# Patient Record
Sex: FEMALE | Race: WHITE | NOT HISPANIC OR LATINO
[De-identification: names, ages, dates, MRNs, and addresses within clinical notes are randomized per-mention and may not be internally consistent; named-entity substitution may affect disease eponyms.]

---

## 2017-03-20 ENCOUNTER — OTHER (OUTPATIENT)
Age: 42
End: 2017-03-20

## 2017-04-03 ENCOUNTER — OTHER (OUTPATIENT)
Age: 42
End: 2017-04-03

## 2017-04-10 ENCOUNTER — OUTPATIENT (OUTPATIENT)
Dept: OUTPATIENT SERVICES | Facility: HOSPITAL | Age: 42
LOS: 1 days | Discharge: HOME | End: 2017-04-10

## 2017-04-10 DIAGNOSIS — C50.919 MALIGNANT NEOPLASM OF UNSPECIFIED SITE OF UNSPECIFIED FEMALE BREAST: ICD-10-CM

## 2017-04-11 ENCOUNTER — OTHER (OUTPATIENT)
Age: 42
End: 2017-04-11

## 2017-04-11 ENCOUNTER — APPOINTMENT (OUTPATIENT)
Dept: HEMATOLOGY ONCOLOGY | Facility: CLINIC | Age: 42
End: 2017-04-11

## 2017-04-11 VITALS
HEART RATE: 69 BPM | TEMPERATURE: 98.2 F | OXYGEN SATURATION: 100 % | RESPIRATION RATE: 16 BRPM | DIASTOLIC BLOOD PRESSURE: 91 MMHG | SYSTOLIC BLOOD PRESSURE: 135 MMHG | WEIGHT: 153 LBS | BODY MASS INDEX: 26.12 KG/M2

## 2017-08-23 ENCOUNTER — OUTPATIENT (OUTPATIENT)
Dept: OUTPATIENT SERVICES | Facility: HOSPITAL | Age: 42
LOS: 1 days | Discharge: HOME | End: 2017-08-23

## 2017-08-23 DIAGNOSIS — C50.919 MALIGNANT NEOPLASM OF UNSPECIFIED SITE OF UNSPECIFIED FEMALE BREAST: ICD-10-CM

## 2017-08-29 ENCOUNTER — APPOINTMENT (OUTPATIENT)
Dept: HEMATOLOGY ONCOLOGY | Facility: CLINIC | Age: 42
End: 2017-08-29
Payer: COMMERCIAL

## 2017-08-29 VITALS
OXYGEN SATURATION: 100 % | BODY MASS INDEX: 25.22 KG/M2 | HEART RATE: 68 BPM | WEIGHT: 147.71 LBS | DIASTOLIC BLOOD PRESSURE: 70 MMHG | TEMPERATURE: 98.2 F | RESPIRATION RATE: 16 BRPM | SYSTOLIC BLOOD PRESSURE: 110 MMHG

## 2017-08-29 PROCEDURE — 99214 OFFICE O/P EST MOD 30 MIN: CPT

## 2017-12-18 ENCOUNTER — OUTPATIENT (OUTPATIENT)
Dept: OUTPATIENT SERVICES | Facility: HOSPITAL | Age: 42
LOS: 1 days | Discharge: HOME | End: 2017-12-18

## 2017-12-18 DIAGNOSIS — C50.919 MALIGNANT NEOPLASM OF UNSPECIFIED SITE OF UNSPECIFIED FEMALE BREAST: ICD-10-CM

## 2017-12-26 ENCOUNTER — APPOINTMENT (OUTPATIENT)
Dept: HEMATOLOGY ONCOLOGY | Facility: CLINIC | Age: 42
End: 2017-12-26
Payer: COMMERCIAL

## 2017-12-26 VITALS
SYSTOLIC BLOOD PRESSURE: 123 MMHG | RESPIRATION RATE: 16 BRPM | DIASTOLIC BLOOD PRESSURE: 94 MMHG | BODY MASS INDEX: 25.75 KG/M2 | TEMPERATURE: 97.4 F | OXYGEN SATURATION: 100 % | WEIGHT: 150.8 LBS | HEART RATE: 69 BPM

## 2017-12-26 PROCEDURE — 99214 OFFICE O/P EST MOD 30 MIN: CPT

## 2017-12-27 ENCOUNTER — APPOINTMENT (OUTPATIENT)
Dept: HEMATOLOGY ONCOLOGY | Facility: CLINIC | Age: 42
End: 2017-12-27
Payer: COMMERCIAL

## 2017-12-27 VITALS
SYSTOLIC BLOOD PRESSURE: 116 MMHG | TEMPERATURE: 98.3 F | WEIGHT: 150.8 LBS | DIASTOLIC BLOOD PRESSURE: 76 MMHG | HEART RATE: 74 BPM | OXYGEN SATURATION: 100 % | RESPIRATION RATE: 16 BRPM | BODY MASS INDEX: 25.75 KG/M2

## 2017-12-27 PROCEDURE — 99212 OFFICE O/P EST SF 10 MIN: CPT

## 2018-02-28 ENCOUNTER — RESULT REVIEW (OUTPATIENT)
Age: 43
End: 2018-02-28

## 2018-03-01 ENCOUNTER — RX RENEWAL (OUTPATIENT)
Age: 43
End: 2018-03-01

## 2018-04-09 ENCOUNTER — RX RENEWAL (OUTPATIENT)
Age: 43
End: 2018-04-09

## 2018-06-26 ENCOUNTER — OUTPATIENT (OUTPATIENT)
Dept: OUTPATIENT SERVICES | Facility: HOSPITAL | Age: 43
LOS: 1 days | Discharge: HOME | End: 2018-06-26

## 2018-06-26 DIAGNOSIS — C50.919 MALIGNANT NEOPLASM OF UNSPECIFIED SITE OF UNSPECIFIED FEMALE BREAST: ICD-10-CM

## 2018-06-29 ENCOUNTER — APPOINTMENT (OUTPATIENT)
Dept: HEMATOLOGY ONCOLOGY | Facility: CLINIC | Age: 43
End: 2018-06-29
Payer: COMMERCIAL

## 2018-06-29 VITALS
SYSTOLIC BLOOD PRESSURE: 114 MMHG | HEART RATE: 78 BPM | TEMPERATURE: 98.2 F | BODY MASS INDEX: 26.31 KG/M2 | OXYGEN SATURATION: 99 % | WEIGHT: 154.1 LBS | DIASTOLIC BLOOD PRESSURE: 79 MMHG | RESPIRATION RATE: 16 BRPM

## 2018-06-29 PROCEDURE — 99213 OFFICE O/P EST LOW 20 MIN: CPT

## 2018-12-17 ENCOUNTER — OUTPATIENT (OUTPATIENT)
Dept: OUTPATIENT SERVICES | Facility: HOSPITAL | Age: 43
LOS: 1 days | Discharge: HOME | End: 2018-12-17

## 2018-12-17 DIAGNOSIS — C50.919 MALIGNANT NEOPLASM OF UNSPECIFIED SITE OF UNSPECIFIED FEMALE BREAST: ICD-10-CM

## 2018-12-28 ENCOUNTER — APPOINTMENT (OUTPATIENT)
Dept: HEMATOLOGY ONCOLOGY | Facility: CLINIC | Age: 43
End: 2018-12-28
Payer: COMMERCIAL

## 2018-12-28 VITALS
SYSTOLIC BLOOD PRESSURE: 123 MMHG | BODY MASS INDEX: 26.69 KG/M2 | TEMPERATURE: 98.5 F | OXYGEN SATURATION: 100 % | HEART RATE: 77 BPM | DIASTOLIC BLOOD PRESSURE: 83 MMHG | WEIGHT: 156.31 LBS | RESPIRATION RATE: 17 BRPM

## 2018-12-28 PROCEDURE — 99214 OFFICE O/P EST MOD 30 MIN: CPT

## 2018-12-28 RX ORDER — BIOTIN 10 MG
TABLET ORAL
Refills: 0 | Status: DISCONTINUED | COMMUNITY
End: 2018-12-28

## 2018-12-28 NOTE — HISTORY OF PRESENT ILLNESS
[Disease: _____________________] : Disease: [unfilled] [T: ___] : T[unfilled] [N: ___] : N[unfilled] [M: ___] : M[unfilled] [AJCC Stage: ____] : AJCC Stage: [unfilled] [de-identified] : H/O  right breast ca s/p B/L MRm with expanders 115. Path right breast showing foci of invasion measuring 3.5 mmm (2 such foci on one slide) with several other foci measuring a few microns to over 1 mm and innumerable foci of microinvasion (over 50), no evidence of LVI with 0/2 SLNs and 0/1 NSLN, ER (-) [place] (-) and her 2 yandel (+); the left breast was w/o cancer and 0/4 SLNs. S/P weekly taxol + H 12/12 in 5/15 and completion of H in 2/16. BRCA 12 neg [de-identified] : ER (-) [place] (-) Her 2neu (+) [de-identified] : Pt w/o complaints. Notes a good appetite stable wt and excellent performance status. Works out [employer] +, Works FT\par \par Menses remain regular \par \par FH \par Mat aunt colon ca at 58\par Mat great aunt br ca at older age\par Pat uncle colon ca at 50\par \par Pt is scheduling a colonoscopy. Has not decided to do further genetic counseling and testing yet. \par Mom and Dad had 4 sibs each of note\par \par Was contacted by plastic surgeon re her having texture implants placed

## 2018-12-28 NOTE — PHYSICAL EXAM
[Fully active, able to carry on all pre-disease performance without restriction] : Status 0 - Fully active, able to carry on all pre-disease performance without restriction [de-identified] : s/p B/L MRM with implants, no masses;  B/L ax neg

## 2019-04-03 ENCOUNTER — RESULT REVIEW (OUTPATIENT)
Age: 44
End: 2019-04-03

## 2019-05-29 ENCOUNTER — OUTPATIENT (OUTPATIENT)
Dept: OUTPATIENT SERVICES | Facility: HOSPITAL | Age: 44
LOS: 1 days | Discharge: HOME | End: 2019-05-29

## 2019-05-29 DIAGNOSIS — C50.919 MALIGNANT NEOPLASM OF UNSPECIFIED SITE OF UNSPECIFIED FEMALE BREAST: ICD-10-CM

## 2019-06-04 ENCOUNTER — APPOINTMENT (OUTPATIENT)
Dept: HEMATOLOGY ONCOLOGY | Facility: CLINIC | Age: 44
End: 2019-06-04
Payer: COMMERCIAL

## 2019-06-04 VITALS
BODY MASS INDEX: 26.54 KG/M2 | WEIGHT: 155.42 LBS | HEART RATE: 59 BPM | RESPIRATION RATE: 17 BRPM | DIASTOLIC BLOOD PRESSURE: 87 MMHG | TEMPERATURE: 97.7 F | OXYGEN SATURATION: 99 % | SYSTOLIC BLOOD PRESSURE: 127 MMHG

## 2019-06-04 PROCEDURE — 99214 OFFICE O/P EST MOD 30 MIN: CPT

## 2019-06-05 NOTE — HISTORY OF PRESENT ILLNESS
[Disease: _____________________] : Disease: [unfilled] [N: ___] : N[unfilled] [T: ___] : T[unfilled] [M: ___] : M[unfilled] [AJCC Stage: ____] : AJCC Stage: [unfilled] [de-identified] : H/O  right breast ca s/p B/L MRm with expanders 115. Path right breast showing foci of invasion measuring 3.5 mmm (2 such foci on one slide) with several other foci measuring a few microns to over 1 mm and innumerable foci of microinvasion (over 50), no evidence of LVI with 0/2 SLNs and 0/1 NSLN, ER (-) [place] (-) and her 2 yandel (+); the left breast was w/o cancer and 0/4 SLNs. S/P weekly taxol + H 12/12 in 5/15 and completion of H in 2/16. BRCA 12 neg [de-identified] : ER (-) [place] (-) Her 2neu (+) [de-identified] : Pt w/o complaints. Notes a good appetite stable wt and excellent performance status. Works out [employer] + but a little frustrated that she does not loose weight.  Works FT. Looking forward to end of school year for time off with her kids. \par \par Menses remain regular \par \par FH \par Mat aunt colon ca at 58\par Mat great aunt br ca at older age\par Pat uncle colon ca at 50\par \par In the interim she had what she describes as a genetic panel sent off by her gyn, the results of which are still pending. \par \par Was contacted by plastic surgeon re her having texture implants replaced. is considering whether to do so now or in the future.

## 2019-06-05 NOTE — PHYSICAL EXAM
[Fully active, able to carry on all pre-disease performance without restriction] : Status 0 - Fully active, able to carry on all pre-disease performance without restriction [de-identified] : s/p B/L MRM with implants, no masses;  B/L ax neg

## 2019-12-17 ENCOUNTER — OUTPATIENT (OUTPATIENT)
Dept: OUTPATIENT SERVICES | Facility: HOSPITAL | Age: 44
LOS: 1 days | Discharge: HOME | End: 2019-12-17

## 2019-12-17 DIAGNOSIS — C50.919 MALIGNANT NEOPLASM OF UNSPECIFIED SITE OF UNSPECIFIED FEMALE BREAST: ICD-10-CM

## 2019-12-24 ENCOUNTER — APPOINTMENT (OUTPATIENT)
Dept: HEMATOLOGY ONCOLOGY | Facility: CLINIC | Age: 44
End: 2019-12-24
Payer: COMMERCIAL

## 2019-12-24 VITALS
TEMPERATURE: 98 F | OXYGEN SATURATION: 100 % | DIASTOLIC BLOOD PRESSURE: 85 MMHG | SYSTOLIC BLOOD PRESSURE: 126 MMHG | HEART RATE: 77 BPM | BODY MASS INDEX: 26.5 KG/M2 | WEIGHT: 155.2 LBS | RESPIRATION RATE: 17 BRPM

## 2019-12-24 PROCEDURE — 99213 OFFICE O/P EST LOW 20 MIN: CPT

## 2019-12-26 NOTE — PHYSICAL EXAM
[Fully active, able to carry on all pre-disease performance without restriction] : Status 0 - Fully active, able to carry on all pre-disease performance without restriction [de-identified] : s/p B/L MRM with implants, no masses;  B/L ax neg

## 2019-12-26 NOTE — HISTORY OF PRESENT ILLNESS
[Disease: _____________________] : Disease: [unfilled] [T: ___] : T[unfilled] [N: ___] : N[unfilled] [M: ___] : M[unfilled] [AJCC Stage: ____] : AJCC Stage: [unfilled] [de-identified] : H/O  right breast ca s/p B/L MRm with expanders 115. Path right breast showing foci of invasion measuring 3.5 mmm (2 such foci on one slide) with several other foci measuring a few microns to over 1 mm and innumerable foci of microinvasion (over 50), no evidence of LVI with 0/2 SLNs and 0/1 NSLN, ER (-) [place] (-) and her 2 yandel (+); the left breast was w/o cancer and 0/4 SLNs. S/P weekly taxol + H 12/12 in 5/15 and completion of H in 2/16. BRCA 12 neg [de-identified] : ER (-) [place] (-) Her 2neu (+) [de-identified] : Pt w/o complaints. Notes a good appetite stable wt and excellent performance status. Cont to remain active working out  approx 5-6 days / week.  \par \par Menses remain regular \par \par FH \par Mat aunt colon ca at 58\par Mat great aunt br ca at older age\par Pat uncle colon ca at 50\par \par In the interim she had what she describes as a genetic panel sent off by her gyn, the results of which are still pending. \par \par Was contacted by plastic surgeon re her having texture implants replaced. is considering whether to do so now or in the future.

## 2020-05-29 ENCOUNTER — RESULT REVIEW (OUTPATIENT)
Age: 45
End: 2020-05-29

## 2020-06-01 ENCOUNTER — APPOINTMENT (OUTPATIENT)
Dept: MRI IMAGING | Facility: IMAGING CENTER | Age: 45
End: 2020-06-01
Payer: COMMERCIAL

## 2020-06-01 ENCOUNTER — OUTPATIENT (OUTPATIENT)
Dept: OUTPATIENT SERVICES | Facility: HOSPITAL | Age: 45
LOS: 1 days | End: 2020-06-01
Payer: COMMERCIAL

## 2020-06-01 DIAGNOSIS — Z00.8 ENCOUNTER FOR OTHER GENERAL EXAMINATION: ICD-10-CM

## 2020-06-01 PROCEDURE — 77047 MRI BREAST C- BILATERAL: CPT

## 2020-06-01 PROCEDURE — 77047 MRI BREAST C- BILATERAL: CPT | Mod: 26

## 2020-07-22 ENCOUNTER — APPOINTMENT (OUTPATIENT)
Dept: HEMATOLOGY ONCOLOGY | Facility: CLINIC | Age: 45
End: 2020-07-22

## 2020-10-29 ENCOUNTER — OUTPATIENT (OUTPATIENT)
Dept: OUTPATIENT SERVICES | Facility: HOSPITAL | Age: 45
LOS: 1 days | Discharge: HOME | End: 2020-10-29

## 2020-10-29 DIAGNOSIS — C50.919 MALIGNANT NEOPLASM OF UNSPECIFIED SITE OF UNSPECIFIED FEMALE BREAST: ICD-10-CM

## 2020-11-03 ENCOUNTER — APPOINTMENT (OUTPATIENT)
Dept: HEMATOLOGY ONCOLOGY | Facility: CLINIC | Age: 45
End: 2020-11-03
Payer: COMMERCIAL

## 2020-11-03 VITALS
DIASTOLIC BLOOD PRESSURE: 89 MMHG | BODY MASS INDEX: 26.73 KG/M2 | HEART RATE: 70 BPM | OXYGEN SATURATION: 99 % | TEMPERATURE: 98 F | RESPIRATION RATE: 16 BRPM | SYSTOLIC BLOOD PRESSURE: 139 MMHG | WEIGHT: 156.53 LBS

## 2020-11-03 PROCEDURE — 99072 ADDL SUPL MATRL&STAF TM PHE: CPT

## 2020-11-03 PROCEDURE — 99213 OFFICE O/P EST LOW 20 MIN: CPT

## 2020-11-04 NOTE — HISTORY OF PRESENT ILLNESS
[Disease: _____________________] : Disease: [unfilled] [T: ___] : T[unfilled] [N: ___] : N[unfilled] [M: ___] : M[unfilled] [AJCC Stage: ____] : AJCC Stage: [unfilled] [de-identified] : H/O  right breast ca s/p B/L MRm with expanders 115. Path right breast showing foci of invasion measuring 3.5 mmm (2 such foci on one slide) with several other foci measuring a few microns to over 1 mm and innumerable foci of microinvasion (over 50), no evidence of LVI with 0/2 SLNs and 0/1 NSLN, ER (-) [place] (-) and her 2 yandel (+); the left breast was w/o cancer and 0/4 SLNs. S/P weekly taxol + H 12/12 in 5/15 and completion of H in 2/16. BRCA 12 neg [de-identified] : ER (-) [place] (-) Her 2neu (+) [de-identified] : Pt w/o complaints. Notes a good appetite stable wt and excellent performance status. 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 \par \par Denies any new symptoms.  \par \par Menses remain regular \par \par FH \par Mat aunt colon ca at 58\par Mat great aunt br ca at older age\par Pat uncle colon ca at 50\par \par In the interim she had what she describes as a genetic panel sent off by her gyn, the results of which are still pending. \par \par Was contacted by plastic surgeon re her having texture implants replaced. is considering whether to do so now or in the future.

## 2020-11-04 NOTE — PHYSICAL EXAM
[Fully active, able to carry on all pre-disease performance without restriction] : Status 0 - Fully active, able to carry on all pre-disease performance without restriction [Normal] : affect appropriate [de-identified] : s/p B/L MRM with implants, no masses;  B/L ax neg

## 2020-11-24 ENCOUNTER — RESULT REVIEW (OUTPATIENT)
Age: 45
End: 2020-11-24

## 2021-02-03 LAB — CYTOLOGY CVX/VAG DOC THIN PREP: NORMAL

## 2021-02-04 ENCOUNTER — ASOB RESULT (OUTPATIENT)
Age: 46
End: 2021-02-04

## 2021-02-04 ENCOUNTER — APPOINTMENT (OUTPATIENT)
Dept: OBGYN | Facility: CLINIC | Age: 46
End: 2021-02-04
Payer: COMMERCIAL

## 2021-02-04 VITALS
HEIGHT: 66 IN | BODY MASS INDEX: 23.78 KG/M2 | WEIGHT: 148 LBS | SYSTOLIC BLOOD PRESSURE: 120 MMHG | DIASTOLIC BLOOD PRESSURE: 84 MMHG

## 2021-02-04 DIAGNOSIS — Z85.3 PERSONAL HISTORY OF MALIGNANT NEOPLASM OF BREAST: ICD-10-CM

## 2021-02-04 DIAGNOSIS — Z30.9 ENCOUNTER FOR CONTRACEPTIVE MANAGEMENT, UNSPECIFIED: ICD-10-CM

## 2021-02-04 DIAGNOSIS — N92.6 IRREGULAR MENSTRUATION, UNSPECIFIED: ICD-10-CM

## 2021-02-04 DIAGNOSIS — E04.1 NONTOXIC SINGLE THYROID NODULE: ICD-10-CM

## 2021-02-04 DIAGNOSIS — N89.8 OTHER SPECIFIED NONINFLAMMATORY DISORDERS OF VAGINA: ICD-10-CM

## 2021-02-04 LAB
BILIRUB UR QL STRIP: NORMAL
CLARITY UR: CLEAR
COLLECTION METHOD: NORMAL
GLUCOSE UR-MCNC: NORMAL
HCG UR QL: NORMAL EU/DL
HGB UR QL STRIP.AUTO: NORMAL
KETONES UR-MCNC: NORMAL
LEUKOCYTE ESTERASE UR QL STRIP: NORMAL
NITRITE UR QL STRIP: NORMAL
PH UR STRIP: NORMAL
PROT UR STRIP-MCNC: NORMAL
SP GR UR STRIP: NORMAL

## 2021-02-04 PROCEDURE — 99396 PREV VISIT EST AGE 40-64: CPT | Mod: 25

## 2021-02-04 PROCEDURE — 99072 ADDL SUPL MATRL&STAF TM PHE: CPT

## 2021-02-04 PROCEDURE — 76830 TRANSVAGINAL US NON-OB: CPT

## 2021-02-04 PROCEDURE — 81003 URINALYSIS AUTO W/O SCOPE: CPT | Mod: QW

## 2021-02-04 PROCEDURE — 82274 ASSAY TEST FOR BLOOD FECAL: CPT | Mod: QW

## 2021-02-04 RX ORDER — LACTIC ACID, L-, CITRIC ACID MONOHYDRATE, AND POTASSIUM BITARTRATE 90; 50; 20 MG/5G; MG/5G; MG/5G
1.8-1-0.4 GEL VAGINAL
Qty: 30 | Refills: 3 | Status: ACTIVE | COMMUNITY
Start: 2021-02-04 | End: 1900-01-01

## 2021-02-04 NOTE — PHYSICAL EXAM
[Appropriately responsive] : appropriately responsive [Alert] : alert [No Acute Distress] : no acute distress [No Lymphadenopathy] : no lymphadenopathy [Thyroid Nodule] : thyroid nodule [Regular Rate Rhythm] : regular rate rhythm [No Murmurs] : no murmurs [Soft] : soft [Clear to Auscultation B/L] : clear to auscultation bilaterally [Non-tender] : non-tender [Non-distended] : non-distended [No HSM] : No HSM [No Lesions] : no lesions [No Mass] : no mass [Oriented x3] : oriented x3 [Labia Majora] : normal [Normal] : normal [FreeTextEntry3] : right thyroid nodules [FreeTextEntry9] : FIT neg

## 2021-02-04 NOTE — HISTORY OF PRESENT ILLNESS
[Patient reported PAP Smear was normal] : Patient reported PAP Smear was normal [Patient reported colonoscopy was normal] : Patient reported colonoscopy was normal [FreeTextEntry1] : \par ovulatory pain using motrin. Pt reports regular periods.  No BTB or abnormal vaginal discharge.  Pt denies any urinary or bowel issues or blood in stools.  + ovulatory pain and uses motrin\par \par Using condoms and did not like IUD and refuses any hormonal options due to hx of Br cancer s/p b/l mastectomy ER,  [place] neg\par Br mri  in 6/20 neg, last  BR MRI 6/20 neg.  TVS 5/20-neg [TextBox_19] : mastectomy [PapSmeardate] : 5/20 [ColonoscopyDate] : 2019

## 2021-02-04 NOTE — PLAN
[FreeTextEntry1] : 45 yr old  with hx BR cancer s/p mastectomy ER, VT-eg nl gyn exam.\par \par 1. f/u endocrine for thyroid nodules\par 2. Phexxy d/w pt for contraception\par 3. TVS today\par 4. fu with b surgeon about repeat Br MRI and oncologist Dr. Watson

## 2021-02-05 LAB — HPV HIGH+LOW RISK DNA PNL CVX: NOT DETECTED

## 2021-02-08 LAB — CYTOLOGY CVX/VAG DOC THIN PREP: NORMAL

## 2021-09-01 ENCOUNTER — OUTPATIENT (OUTPATIENT)
Dept: OUTPATIENT SERVICES | Facility: HOSPITAL | Age: 46
LOS: 1 days | Discharge: HOME | End: 2021-09-01

## 2021-09-01 DIAGNOSIS — C50.919 MALIGNANT NEOPLASM OF UNSPECIFIED SITE OF UNSPECIFIED FEMALE BREAST: ICD-10-CM

## 2021-09-03 ENCOUNTER — APPOINTMENT (OUTPATIENT)
Dept: HEMATOLOGY ONCOLOGY | Facility: CLINIC | Age: 46
End: 2021-09-03
Payer: COMMERCIAL

## 2021-09-03 VITALS
TEMPERATURE: 97.5 F | WEIGHT: 156.97 LBS | HEART RATE: 89 BPM | DIASTOLIC BLOOD PRESSURE: 89 MMHG | BODY MASS INDEX: 25.23 KG/M2 | RESPIRATION RATE: 16 BRPM | HEIGHT: 66 IN | SYSTOLIC BLOOD PRESSURE: 131 MMHG | OXYGEN SATURATION: 100 %

## 2021-09-03 PROCEDURE — 99213 OFFICE O/P EST LOW 20 MIN: CPT

## 2021-09-03 NOTE — HISTORY OF PRESENT ILLNESS
[Disease: _____________________] : Disease: [unfilled] [T: ___] : T[unfilled] [N: ___] : N[unfilled] [M: ___] : M[unfilled] [AJCC Stage: ____] : AJCC Stage: [unfilled] [de-identified] : H/O  right breast ca s/p B/L MRm with expanders 115. Path right breast showing foci of invasion measuring 3.5 mmm (2 such foci on one slide) with several other foci measuring a few microns to over 1 mm and innumerable foci of microinvasion (over 50), no evidence of LVI with 0/2 SLNs and 0/1 NSLN, ER (-) [place] (-) and her 2 yandel (+); the left breast was w/o cancer and 0/4 SLNs. S/P weekly taxol + H 12/12 in 5/15 and completion of H in 2/16. BRCA 12 neg [de-identified] : ER (-) [place] (-) Her 2neu (+) [de-identified] : Pt w/o complaints. Notes a good appetite stable wt and excellent performance status. 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 \par \par Anxious and upset at [employer] demanding Covid 19 vaccines - she has not had one and does not wish to do so. \par \par Denies any new symptoms.  \par \par Menses remain regular \par \par FH \par Mat aunt colon ca at 58\par Mat great aunt br ca at older age\par Pat uncle colon ca at 50\par \par In the interim she had what she describes as a genetic panel sent off by her gyn, the results of which are still pending. \par \par Was contacted by plastic surgeon re her having texture implants replaced. is considering whether to do so now or in the future.

## 2021-09-03 NOTE — PHYSICAL EXAM
[Fully active, able to carry on all pre-disease performance without restriction] : Status 0 - Fully active, able to carry on all pre-disease performance without restriction [Normal] : grossly intact [de-identified] : s/p B/L MRM with implants, no masses;  B/L ax neg

## 2021-12-04 ENCOUNTER — ASOB RESULT (OUTPATIENT)
Age: 46
End: 2021-12-04

## 2021-12-04 ENCOUNTER — APPOINTMENT (OUTPATIENT)
Dept: OBGYN | Facility: CLINIC | Age: 46
End: 2021-12-04
Payer: COMMERCIAL

## 2021-12-04 VITALS
DIASTOLIC BLOOD PRESSURE: 82 MMHG | SYSTOLIC BLOOD PRESSURE: 126 MMHG | WEIGHT: 145 LBS | HEIGHT: 66 IN | BODY MASS INDEX: 23.3 KG/M2

## 2021-12-04 PROCEDURE — 76830 TRANSVAGINAL US NON-OB: CPT

## 2021-12-04 PROCEDURE — 99214 OFFICE O/P EST MOD 30 MIN: CPT

## 2022-12-22 ENCOUNTER — APPOINTMENT (OUTPATIENT)
Dept: OBGYN | Facility: CLINIC | Age: 47
End: 2022-12-22

## 2022-12-22 ENCOUNTER — ASOB RESULT (OUTPATIENT)
Age: 47
End: 2022-12-22

## 2022-12-22 PROCEDURE — 76830 TRANSVAGINAL US NON-OB: CPT

## 2022-12-27 ENCOUNTER — NON-APPOINTMENT (OUTPATIENT)
Age: 47
End: 2022-12-27

## 2022-12-27 ENCOUNTER — APPOINTMENT (OUTPATIENT)
Dept: OBGYN | Facility: CLINIC | Age: 47
End: 2022-12-27

## 2023-01-24 ENCOUNTER — APPOINTMENT (OUTPATIENT)
Dept: OBGYN | Facility: CLINIC | Age: 48
End: 2023-01-24
Payer: COMMERCIAL

## 2023-01-24 VITALS
WEIGHT: 150 LBS | HEIGHT: 66 IN | SYSTOLIC BLOOD PRESSURE: 138 MMHG | DIASTOLIC BLOOD PRESSURE: 81 MMHG | BODY MASS INDEX: 24.11 KG/M2

## 2023-01-24 PROCEDURE — 82270 OCCULT BLOOD FECES: CPT

## 2023-01-24 PROCEDURE — 99396 PREV VISIT EST AGE 40-64: CPT

## 2023-01-25 LAB — HPV HIGH+LOW RISK DNA PNL CVX: NOT DETECTED

## 2023-01-27 ENCOUNTER — OUTPATIENT (OUTPATIENT)
Dept: OUTPATIENT SERVICES | Facility: HOSPITAL | Age: 48
LOS: 1 days | Discharge: HOME | End: 2023-01-27

## 2023-01-27 DIAGNOSIS — C50.919 MALIGNANT NEOPLASM OF UNSPECIFIED SITE OF UNSPECIFIED FEMALE BREAST: ICD-10-CM

## 2023-01-27 LAB — CYTOLOGY CVX/VAG DOC THIN PREP: NORMAL

## 2023-02-03 ENCOUNTER — APPOINTMENT (OUTPATIENT)
Dept: HEMATOLOGY ONCOLOGY | Facility: CLINIC | Age: 48
End: 2023-02-03
Payer: COMMERCIAL

## 2023-02-03 VITALS
TEMPERATURE: 97.3 F | WEIGHT: 156.09 LBS | SYSTOLIC BLOOD PRESSURE: 131 MMHG | RESPIRATION RATE: 16 BRPM | HEART RATE: 92 BPM | OXYGEN SATURATION: 93 % | BODY MASS INDEX: 25.19 KG/M2 | DIASTOLIC BLOOD PRESSURE: 86 MMHG

## 2023-02-03 DIAGNOSIS — C50.919 MALIGNANT NEOPLASM OF UNSPECIFIED SITE OF UNSPECIFIED FEMALE BREAST: ICD-10-CM

## 2023-02-03 DIAGNOSIS — F41.9 ANXIETY DISORDER, UNSPECIFIED: ICD-10-CM

## 2023-02-03 PROCEDURE — 99213 OFFICE O/P EST LOW 20 MIN: CPT

## 2023-02-03 NOTE — PHYSICAL EXAM
[Fully active, able to carry on all pre-disease performance without restriction] : Status 0 - Fully active, able to carry on all pre-disease performance without restriction [Normal] : affect appropriate [de-identified] : s/p B/L MRM with implants, no masses;  B/L ax neg

## 2023-02-03 NOTE — HISTORY OF PRESENT ILLNESS
[Disease: _____________________] : Disease: [unfilled] [T: ___] : T[unfilled] [N: ___] : N[unfilled] [M: ___] : M[unfilled] [AJCC Stage: ____] : AJCC Stage: [unfilled] [de-identified] : H/O  right breast ca s/p B/L MRm with expanders 115. Path right breast showing foci of invasion measuring 3.5 mmm (2 such foci on one slide) with several other foci measuring a few microns to over 1 mm and innumerable foci of microinvasion (over 50), no evidence of LVI with 0/2 SLNs and 0/1 NSLN, ER (-) [place] (-) and her 2 yandel (+); the left breast was w/o cancer and 0/4 SLNs. S/P weekly taxol + H 12/12 in 5/15 and completion of H in 2/16. BRCA 12 neg [de-identified] : ER (-) [place] (-) Her 2neu (+) [de-identified] : Pt w/o complaints. \par \par Notes a good appetite stable wt and excellent performance status. 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 \par \par Menses remain regular \par \par Cancer predisposition panel test done by gyn 3-4 yr ago _"neg " per pt\par \par FH \par Mat aunt colon ca at 58\par Mat great aunt br ca at older age\par Pat uncle colon ca at 50\par \par In the interim she had what she describes as a genetic panel sent off by her gyn, the results of which are still pending. \par \par Was contacted by plastic surgeon re her having texture implants replaced. is considering whether to do so now or in the future.

## 2023-02-13 RX ORDER — ALPRAZOLAM 0.25 MG/1
0.25 TABLET ORAL
Qty: 30 | Refills: 0 | Status: ACTIVE | COMMUNITY
Start: 2021-09-03 | End: 1900-01-01

## 2023-06-28 ENCOUNTER — APPOINTMENT (OUTPATIENT)
Dept: OBGYN | Facility: CLINIC | Age: 48
End: 2023-06-28

## 2023-09-06 ENCOUNTER — APPOINTMENT (OUTPATIENT)
Dept: OBGYN | Facility: CLINIC | Age: 48
End: 2023-09-06

## 2023-10-03 ENCOUNTER — APPOINTMENT (OUTPATIENT)
Dept: OBGYN | Facility: CLINIC | Age: 48
End: 2023-10-03

## 2023-10-12 ENCOUNTER — NON-APPOINTMENT (OUTPATIENT)
Age: 48
End: 2023-10-12

## 2023-10-17 ENCOUNTER — APPOINTMENT (OUTPATIENT)
Dept: OBGYN | Facility: CLINIC | Age: 48
End: 2023-10-17
Payer: COMMERCIAL

## 2023-10-17 ENCOUNTER — ASOB RESULT (OUTPATIENT)
Age: 48
End: 2023-10-17

## 2023-10-17 VITALS
SYSTOLIC BLOOD PRESSURE: 143 MMHG | WEIGHT: 156 LBS | HEIGHT: 66 IN | BODY MASS INDEX: 25.07 KG/M2 | DIASTOLIC BLOOD PRESSURE: 86 MMHG

## 2023-10-17 DIAGNOSIS — Z01.419 ENCOUNTER FOR GYNECOLOGICAL EXAMINATION (GENERAL) (ROUTINE) W/OUT ABNORMAL FINDINGS: ICD-10-CM

## 2023-10-17 DIAGNOSIS — Z85.3 PERSONAL HISTORY OF MALIGNANT NEOPLASM OF BREAST: ICD-10-CM

## 2023-10-17 PROCEDURE — 82270 OCCULT BLOOD FECES: CPT

## 2023-10-17 PROCEDURE — 76830 TRANSVAGINAL US NON-OB: CPT

## 2023-10-17 PROCEDURE — 99213 OFFICE O/P EST LOW 20 MIN: CPT

## 2024-12-02 ENCOUNTER — APPOINTMENT (OUTPATIENT)
Dept: OBGYN | Facility: CLINIC | Age: 49
End: 2024-12-02

## 2024-12-30 ENCOUNTER — NON-APPOINTMENT (OUTPATIENT)
Age: 49
End: 2024-12-30

## 2024-12-30 ENCOUNTER — ASOB RESULT (OUTPATIENT)
Age: 49
End: 2024-12-30

## 2024-12-30 ENCOUNTER — APPOINTMENT (OUTPATIENT)
Dept: OBGYN | Facility: CLINIC | Age: 49
End: 2024-12-30
Payer: COMMERCIAL

## 2024-12-30 VITALS
BODY MASS INDEX: 24.91 KG/M2 | HEIGHT: 66 IN | DIASTOLIC BLOOD PRESSURE: 87 MMHG | SYSTOLIC BLOOD PRESSURE: 129 MMHG | WEIGHT: 155 LBS

## 2024-12-30 DIAGNOSIS — Z01.419 ENCOUNTER FOR GYNECOLOGICAL EXAMINATION (GENERAL) (ROUTINE) W/OUT ABNORMAL FINDINGS: ICD-10-CM

## 2024-12-30 PROCEDURE — 99396 PREV VISIT EST AGE 40-64: CPT

## 2024-12-30 PROCEDURE — 99459 PELVIC EXAMINATION: CPT

## 2024-12-30 PROCEDURE — 76830 TRANSVAGINAL US NON-OB: CPT

## 2025-01-01 LAB — HPV HIGH+LOW RISK DNA PNL CVX: NOT DETECTED

## 2025-01-03 LAB — CYTOLOGY CVX/VAG DOC THIN PREP: NORMAL
